# Patient Record
Sex: FEMALE | Race: WHITE | NOT HISPANIC OR LATINO | Employment: OTHER | URBAN - METROPOLITAN AREA
[De-identification: names, ages, dates, MRNs, and addresses within clinical notes are randomized per-mention and may not be internally consistent; named-entity substitution may affect disease eponyms.]

---

## 2017-03-05 ENCOUNTER — PATIENT OUTREACH (OUTPATIENT)
Dept: HEALTH INFORMATION MANAGEMENT | Facility: OTHER | Age: 77
End: 2017-03-05

## 2017-03-14 NOTE — PROGRESS NOTES
3/14/17  -  Annual Wellness Visit Scheduling  1. Scheduling Status:Not scheduled.   Prefers appt with PCP            Care Gap Scheduling (Attempt to Schedule EACH Overdue Care Gap!)     Health Maintenance Due   Topic Date Due   • IMM DTaP/Tdap/Td Vaccine (1 - Tdap) 03/20/1959   • PAP SMEAR  03/20/1961   • IMM PNEUMOCOCCAL 65+ (ADULT) LOW/MEDIUM RISK SERIES (2 of 2 - PPSV23) 01/27/2013   • MAMMOGRAM  11/13/2015   • IMM INFLUENZA (1) 09/01/2016

## 2019-03-07 ENCOUNTER — PATIENT OUTREACH (OUTPATIENT)
Dept: HEALTH INFORMATION MANAGEMENT | Facility: OTHER | Age: 79
End: 2019-03-07

## 2019-03-07 NOTE — PROGRESS NOTES
Outcome: Left Message    Please transfer to Patient Outreach Team at [phone] when patient returns call.      Attempt # 1

## 2019-03-18 NOTE — PROGRESS NOTES
Outcome: Left Message    Please transfer to Patient Outreach Team at [phone] when patient returns call.    Attempt # 2

## 2019-03-29 NOTE — PROGRESS NOTES
Outcome: Left Message    Please transfer to Patient Outreach Team at [phone] when patient returns call.    Attempt # 3

## 2021-01-13 DIAGNOSIS — Z23 NEED FOR VACCINATION: ICD-10-CM

## 2021-04-13 PROBLEM — G50.0 TN (TRIGEMINAL NEURALGIA): Status: ACTIVE | Noted: 2021-02-01

## 2021-04-13 PROBLEM — E23.6 PITUITARY MASS (HCC): Status: ACTIVE | Noted: 2021-02-01

## 2021-04-13 PROBLEM — E87.1 HYPONATREMIA: Status: ACTIVE | Noted: 2021-03-12

## 2022-04-13 PROBLEM — E87.6 HYPOKALEMIA: Status: ACTIVE | Noted: 2022-04-13

## 2022-04-13 PROBLEM — E83.42 HYPOMAGNESEMIA: Status: ACTIVE | Noted: 2022-04-13

## 2022-04-13 PROBLEM — R55 SYNCOPE AND COLLAPSE: Status: ACTIVE | Noted: 2022-04-13

## 2022-04-13 PROBLEM — Z71.89 ACP (ADVANCE CARE PLANNING): Status: ACTIVE | Noted: 2022-04-13

## 2022-04-13 PROBLEM — R79.89 POSITIVE D DIMER: Status: ACTIVE | Noted: 2022-04-13
